# Patient Record
Sex: MALE | Race: OTHER | NOT HISPANIC OR LATINO | URBAN - METROPOLITAN AREA
[De-identification: names, ages, dates, MRNs, and addresses within clinical notes are randomized per-mention and may not be internally consistent; named-entity substitution may affect disease eponyms.]

---

## 2020-10-05 ENCOUNTER — INPATIENT (INPATIENT)
Facility: HOSPITAL | Age: 55
LOS: 2 days | Discharge: HOME | End: 2020-10-08
Attending: PSYCHIATRY & NEUROLOGY | Admitting: PSYCHIATRY & NEUROLOGY
Payer: MEDICARE

## 2020-10-05 VITALS
OXYGEN SATURATION: 100 % | SYSTOLIC BLOOD PRESSURE: 117 MMHG | RESPIRATION RATE: 18 BRPM | TEMPERATURE: 98 F | DIASTOLIC BLOOD PRESSURE: 88 MMHG | HEART RATE: 72 BPM

## 2020-10-05 DIAGNOSIS — F60.3 BORDERLINE PERSONALITY DISORDER: ICD-10-CM

## 2020-10-05 DIAGNOSIS — F33.1 MAJOR DEPRESSIVE DISORDER, RECURRENT, MODERATE: ICD-10-CM

## 2020-10-05 LAB
ALBUMIN SERPL ELPH-MCNC: 4.5 G/DL — SIGNIFICANT CHANGE UP (ref 3.3–5)
ALP SERPL-CCNC: 79 U/L — SIGNIFICANT CHANGE UP (ref 40–120)
ALT FLD-CCNC: 16 U/L — SIGNIFICANT CHANGE UP (ref 4–41)
AMPHET UR-MCNC: NEGATIVE — SIGNIFICANT CHANGE UP
ANION GAP SERPL CALC-SCNC: 11 MMO/L — SIGNIFICANT CHANGE UP (ref 7–14)
APAP SERPL-MCNC: < 15 UG/ML — LOW (ref 15–25)
APPEARANCE UR: CLEAR — SIGNIFICANT CHANGE UP
AST SERPL-CCNC: 12 U/L — SIGNIFICANT CHANGE UP (ref 4–40)
BACTERIA # UR AUTO: SIGNIFICANT CHANGE UP
BARBITURATES UR SCN-MCNC: NEGATIVE — SIGNIFICANT CHANGE UP
BASOPHILS # BLD AUTO: 0.02 K/UL — SIGNIFICANT CHANGE UP (ref 0–0.2)
BASOPHILS NFR BLD AUTO: 0.3 % — SIGNIFICANT CHANGE UP (ref 0–2)
BENZODIAZ UR-MCNC: NEGATIVE — SIGNIFICANT CHANGE UP
BILIRUB SERPL-MCNC: 0.9 MG/DL — SIGNIFICANT CHANGE UP (ref 0.2–1.2)
BILIRUB UR-MCNC: NEGATIVE — SIGNIFICANT CHANGE UP
BLOOD UR QL VISUAL: NEGATIVE — SIGNIFICANT CHANGE UP
BUN SERPL-MCNC: 17 MG/DL — SIGNIFICANT CHANGE UP (ref 7–23)
CALCIUM SERPL-MCNC: 9.9 MG/DL — SIGNIFICANT CHANGE UP (ref 8.4–10.5)
CANNABINOIDS UR-MCNC: NEGATIVE — SIGNIFICANT CHANGE UP
CHLORIDE SERPL-SCNC: 101 MMOL/L — SIGNIFICANT CHANGE UP (ref 98–107)
CO2 SERPL-SCNC: 27 MMOL/L — SIGNIFICANT CHANGE UP (ref 22–31)
COCAINE METAB.OTHER UR-MCNC: POSITIVE — SIGNIFICANT CHANGE UP
COD CRY URNS QL: SIGNIFICANT CHANGE UP (ref 0–0)
COLOR SPEC: YELLOW — SIGNIFICANT CHANGE UP
CREAT SERPL-MCNC: 0.89 MG/DL — SIGNIFICANT CHANGE UP (ref 0.5–1.3)
EOSINOPHIL # BLD AUTO: 0.24 K/UL — SIGNIFICANT CHANGE UP (ref 0–0.5)
EOSINOPHIL NFR BLD AUTO: 3.3 % — SIGNIFICANT CHANGE UP (ref 0–6)
ETHANOL BLD-MCNC: < 10 MG/DL — SIGNIFICANT CHANGE UP
GLUCOSE SERPL-MCNC: 80 MG/DL — SIGNIFICANT CHANGE UP (ref 70–99)
GLUCOSE UR-MCNC: NEGATIVE — SIGNIFICANT CHANGE UP
HCT VFR BLD CALC: 44 % — SIGNIFICANT CHANGE UP (ref 39–50)
HGB BLD-MCNC: 14.3 G/DL — SIGNIFICANT CHANGE UP (ref 13–17)
HYALINE CASTS # UR AUTO: NEGATIVE — SIGNIFICANT CHANGE UP
IMM GRANULOCYTES NFR BLD AUTO: 0.1 % — SIGNIFICANT CHANGE UP (ref 0–1.5)
KETONES UR-MCNC: SIGNIFICANT CHANGE UP
LEUKOCYTE ESTERASE UR-ACNC: NEGATIVE — SIGNIFICANT CHANGE UP
LYMPHOCYTES # BLD AUTO: 1.17 K/UL — SIGNIFICANT CHANGE UP (ref 1–3.3)
LYMPHOCYTES # BLD AUTO: 16 % — SIGNIFICANT CHANGE UP (ref 13–44)
MCHC RBC-ENTMCNC: 26.1 PG — LOW (ref 27–34)
MCHC RBC-ENTMCNC: 32.5 % — SIGNIFICANT CHANGE UP (ref 32–36)
MCV RBC AUTO: 80.3 FL — SIGNIFICANT CHANGE UP (ref 80–100)
METHADONE UR-MCNC: NEGATIVE — SIGNIFICANT CHANGE UP
MONOCYTES # BLD AUTO: 0.55 K/UL — SIGNIFICANT CHANGE UP (ref 0–0.9)
MONOCYTES NFR BLD AUTO: 7.5 % — SIGNIFICANT CHANGE UP (ref 2–14)
MUCOUS THREADS # UR AUTO: SIGNIFICANT CHANGE UP
NEUTROPHILS # BLD AUTO: 5.31 K/UL — SIGNIFICANT CHANGE UP (ref 1.8–7.4)
NEUTROPHILS NFR BLD AUTO: 72.8 % — SIGNIFICANT CHANGE UP (ref 43–77)
NITRITE UR-MCNC: NEGATIVE — SIGNIFICANT CHANGE UP
NRBC # FLD: 0 K/UL — SIGNIFICANT CHANGE UP (ref 0–0)
OPIATES UR-MCNC: NEGATIVE — SIGNIFICANT CHANGE UP
OXYCODONE UR-MCNC: NEGATIVE — SIGNIFICANT CHANGE UP
PCP UR-MCNC: NEGATIVE — SIGNIFICANT CHANGE UP
PH UR: 6 — SIGNIFICANT CHANGE UP (ref 5–8)
PLATELET # BLD AUTO: 317 K/UL — SIGNIFICANT CHANGE UP (ref 150–400)
PMV BLD: 10.8 FL — SIGNIFICANT CHANGE UP (ref 7–13)
POTASSIUM SERPL-MCNC: 4.5 MMOL/L — SIGNIFICANT CHANGE UP (ref 3.5–5.3)
POTASSIUM SERPL-SCNC: 4.5 MMOL/L — SIGNIFICANT CHANGE UP (ref 3.5–5.3)
PROT SERPL-MCNC: 7.1 G/DL — SIGNIFICANT CHANGE UP (ref 6–8.3)
PROT UR-MCNC: 30 — SIGNIFICANT CHANGE UP
RBC # BLD: 5.48 M/UL — SIGNIFICANT CHANGE UP (ref 4.2–5.8)
RBC # FLD: 13.9 % — SIGNIFICANT CHANGE UP (ref 10.3–14.5)
RBC CASTS # UR COMP ASSIST: SIGNIFICANT CHANGE UP (ref 0–?)
SALICYLATES SERPL-MCNC: < 5 MG/DL — LOW (ref 15–30)
SODIUM SERPL-SCNC: 139 MMOL/L — SIGNIFICANT CHANGE UP (ref 135–145)
SP GR SPEC: 1.03 — SIGNIFICANT CHANGE UP (ref 1–1.04)
SQUAMOUS # UR AUTO: SIGNIFICANT CHANGE UP
TSH SERPL-MCNC: 0.94 UIU/ML — SIGNIFICANT CHANGE UP (ref 0.27–4.2)
UROBILINOGEN FLD QL: HIGH
WBC # BLD: 7.3 K/UL — SIGNIFICANT CHANGE UP (ref 3.8–10.5)
WBC # FLD AUTO: 7.3 K/UL — SIGNIFICANT CHANGE UP (ref 3.8–10.5)
WBC UR QL: SIGNIFICANT CHANGE UP (ref 0–?)

## 2020-10-05 PROCEDURE — 99285 EMERGENCY DEPT VISIT HI MDM: CPT | Mod: GC

## 2020-10-05 RX ORDER — TRAZODONE HCL 50 MG
50 TABLET ORAL AT BEDTIME
Refills: 0 | Status: DISCONTINUED | OUTPATIENT
Start: 2020-10-06 | End: 2020-10-08

## 2020-10-05 RX ORDER — GABAPENTIN 400 MG/1
100 CAPSULE ORAL THREE TIMES A DAY
Refills: 0 | Status: DISCONTINUED | OUTPATIENT
Start: 2020-10-06 | End: 2020-10-08

## 2020-10-05 RX ORDER — SERTRALINE 25 MG/1
50 TABLET, FILM COATED ORAL DAILY
Refills: 0 | Status: DISCONTINUED | OUTPATIENT
Start: 2020-10-06 | End: 2020-10-08

## 2020-10-05 NOTE — ED BEHAVIORAL HEALTH NOTE - BEHAVIORAL HEALTH NOTE
Writer provided number for pt's daughter (13 yrs old) mother at [phone]. Writer called number 2x and received VM. Message left with a return call requested. Writer then called emergency contact listed as Shahrzad Duron at [phone]. Ms. Duron provided the following information:      Ms. Duron reportedly the godmother of pt's daughter. Ms. Duron has known pt since 1995. Pt reportedly living alone although recently gaining custody of 13 year old daughter, Haven Amador, in July 2020. Pt received house through [employer] with some type of subsidized rent reported. Ms. Duron states daughter although staying with mother who usually just has pt on weekends for last month. Pt reported to have a hx of substance use. Ms. Duron reports drug of choice to be crack. Pt was reported to be sober for some time and "thought he was able to manage it". Ms. Duron reported talking to pt briefly over last couple weeks by text. Pt apparently sent a text today saying goodbye. The text was followed by another text 45 minutes later saying "tell my family goodbye, I'm sorry to do this right now". Pt's sister reached out to Ms. Duron who reported that pt sent her a similar text and that they were trying to get a hold of pt. Ms. Duron reports similar situation when pt relapses he gets depressed. One past sucide attempts via "took pills" said to be in the early 90's. No hx of psychosis reported. No known concerns reported. Ms. Duron denies any concerns for safety of daughter. Ms. Amador reports that pt is very close with his daugther and daugther does not always get along with mother. Daughter apparently choose to live with father full time. Daughter reported to although be safe with both mother and father. Pt said to be good father. Pt recently changed job working nights. Daughter was staying with mother while pt was working. Ms. Duron reports that she personally spoke with pt's daughter today and that she was okay. Ms. Duron denies safety concerns for daughter. Ms. Duron expresses thought that pt got a large sum of money and used it on substances.

## 2020-10-05 NOTE — ED BEHAVIORAL HEALTH ASSESSMENT NOTE - CASE SUMMARY
54/M with hx (as per Psyckes) of MDD, PTSD, and borderline PD; has past in-Pt psych admissions, hx of SA (OD on pills, throwing self in front of incoming traffic) as well as self-injurious behavior (via cutting) and hx of cocaine/alcohol abuse.  Today, presented to the ED BIB EMS for SI.     At this time, the Pt endorses worsening symptoms of depression and anxiety precipitated and perpetuated by multiple psychosocial stressors.  The Pt is dysphoric; anxious about current life situation much so that he claimed to have relapse on cocaine.  Pt has engaged in illicit drug use as a means to alleviate symptoms of depression and anxiety.  Pt is endorsing passive SI but no intent or plans.  No active Suicidality or any homicidality expressed.  Currently, does not appear to be acutely manic or psychotic. Is not in withdrawal.  Pt is not delirious.  Pt's symptoms of depression and anxiety have caused functional impairment.  At this time, unable to partake towards safety planning enough to warrant for a safe discharge back to the community.  Is expressing ambivalence - "does not feel safe if discharged". Pt is help seeking and wants to pursue voluntary admission.    RECOMMENDATIONS:  1. RESTART with the following: Zoloft 50mg daily for control of his depression. Treatment team to titrate as deemed necessary.  RESTART Gabapentin 100mg TID for control of his anxiety.   2. PRN: Trazodone 50mg HS PRN as off label for sleep disturbance   3. PRN: ativan 2mg PO/IM q6Hrs for anxiety/agitation (PO)/ SEVERE AGITATION (IM)   4. once medically cleared, facilitate transfer to [facility] on 9.13 status

## 2020-10-05 NOTE — ED BEHAVIORAL HEALTH ASSESSMENT NOTE - DESCRIPTION (FIRST USE, LAST USE, QUANTITY, FREQUENCY, DURATION)
pt with hx of alcohol use disorder, in rehab in 2014, last drink was "sips of liquor" late Sunday night used THC but has not used in over 20 years pt with hx of cocaine use disorder, in rehab in 2014, used for the first time since then late Sunday night

## 2020-10-05 NOTE — ED BEHAVIORAL HEALTH ASSESSMENT NOTE - PSYCHIATRIC ISSUES AND PLAN (INCLUDE STANDING AND PRN MEDICATION)
restart Zoloft at 50 mg daily; start Gabapentin 100 mg TID; start Trazodone 50 mg qHS PRN for sleep; Ativan 2 mg PO/IM PRN q6H for agitation

## 2020-10-05 NOTE — ED ADULT NURSE NOTE - OBJECTIVE STATEMENT
Arrives from home states he went through a custody contreras with his ex-wife recently and has been having passive SI. Denies HI, reports cocaine use at 3am and one alcoholic drink at about 3pm. PMH depression, BPD, anxiety  Patient to be evaluated by psych MD. Seen by Medical and waiting for further orders and disposition.   JF Kapoor

## 2020-10-05 NOTE — ED ADULT TRIAGE NOTE - CHIEF COMPLAINT QUOTE
Arrives from [place] states he went through a custody contreras with his ex-wife recently and has been having passive SI. Denies HI, reports cocaine use at 3am and one alcoholic drink at about 3pm. PMH depression, BPD, anxiety

## 2020-10-05 NOTE — ED BEHAVIORAL HEALTH NOTE - BEHAVIORAL HEALTH NOTE
COVID Exposure Screen- Patient    1.	*In the past 14 days, have you been around anyone with a positive COVID-19 test?*   (  ) Yes   ( X ) No   (  ) Unknown- Reason (e.g. patient uncertain, sedated, refusing to answer, etc.):  ______  IF YES PROCEED TO QUESTION #2. IF NO or UNKNOWN, PLEASE SKIP TO QUESTION #7  2.	Were you within 6 feet of them for at least 15 minutes? (  ) Yes   (  ) No   (  ) Unknown- Reason: ______    3.	Have you provided care for them? (  ) Yes   (  ) No   (  ) Unknown- Reason: ______    4.	Have you had direct physical contact with them (touched, hugged, or kissed them)? (  ) Yes   (  ) No    (  ) Unknown- Reason: ______    5.	Have you shared eating or drinking utensils with them? (  ) Yes   (  ) No    (  ) Unknown- Reason: ______    6.	Have they sneezed, coughed, or somehow got respiratory droplets on you? (  ) Yes   (  ) No    (  ) Unknown- Reason: ______    7.	*Have you been out of [place] within the past 14 days?*  (  ) Yes   ( X ) No   (  ) Unknown- Reason (e.g. patient uncertain, sedated, refusing to answer, etc.): _______  IF YES PLEASE ANSWER THE FOLLOWING QUESTIONS:  8.	Which state/country have you been to? ______   9.	Were you there over 24 hours? (  ) Yes   (  ) No    (  ) Unknown- Reason: ______    10.	What date did you return to [facility]? ______

## 2020-10-05 NOTE — ED BEHAVIORAL HEALTH ASSESSMENT NOTE - ADDITIONAL DETAILS ALL
pt reports remote hx of SAs via throwing himself into traffic, cutting, and overdosing on pills from his mid20s to mid30s

## 2020-10-05 NOTE — ED BEHAVIORAL HEALTH ASSESSMENT NOTE - MODIFICATIONS
I have examined and evaluated the patient with Dr NATALIIA Mancilla and performed key elements of the History and Mental Status/Physical examination.  I agree with her assessment and recommendations.

## 2020-10-05 NOTE — ED BEHAVIORAL HEALTH ASSESSMENT NOTE - OTHER PAST PSYCHIATRIC HISTORY (INCLUDE DETAILS REGARDING ONSET, COURSE OF ILLNESS, INPATIENT/OUTPATIENT TREATMENT)
hx of borderline personality disorder, depression, anxiety, substance use disorder   multiple past inpatient hospitalizations (including [place], [place], [facility]) - most recent was [facility] in 2013 as per GENNY   currently in outpatient treatment in [facility], primarily with therapist Ryann who he talks to weekly   hx of suicide attempts/parasuicidal behavior during his 20s and 30s via overdose, jumping in front of car, cutting

## 2020-10-05 NOTE — ED BEHAVIORAL HEALTH ASSESSMENT NOTE - RISK ASSESSMENT
Risk factors:   Acute: current mood episode, interpersonal conflict, lack of medication consistency, recent SI with plan and preparatory acts, recent substance use   Chronic: gender, hx of past inpatient hospitalizations, hx of past suicide attempts, hx of substance use disorder, borderline personality disorder    Protective: domiciled, has daughter, no CAH, no access to firearms, employed, help-seeking     Veronica is a high acute risk of harm to self given his multiple acute and chronic risk factors. He requires inpatient hospitalization for safety, stabilization, and medication management. High Acute Suicide Risk

## 2020-10-05 NOTE — ED ADULT NURSE NOTE - CHIEF COMPLAINT QUOTE
Arrives from [place] states he went through a custody contreras with his ex-wife recently and has been having passive SI. Denies HI, reports cocaine use at 3am and one alcoholic drink at about 3pm. PMH depression, BPD, anxiety

## 2020-10-05 NOTE — ED PROVIDER NOTE - CLINICAL SUMMARY MEDICAL DECISION MAKING FREE TEXT BOX
This is a 54 yr old M, pmh depression, bipolar disorder with c/o depression and passive SI. Pt states having a hard time. Currently she has a custody over her daughter, who is visiting her mother and wants to stay with the mother. He agreed to it. But daughter's mother asking money from him, and they have argument about custody and financial issues since Saturday. PT endorses he wants to hurt himself. He reports previous si attempt, od on pills and last psych hospitalization in 2007.   Labs, psych consult- recommendation inpatient treatment.

## 2020-10-05 NOTE — ED BEHAVIORAL HEALTH ASSESSMENT NOTE - DETAILS
14 years old see HPI handoff provided to [facility] Dr. Kilpatrikc informed sister, Jonna [phone]

## 2020-10-05 NOTE — ED PROVIDER NOTE - OBJECTIVE STATEMENT
This is a 54 yr old M, pmh depression, bipolar disorder with c/o depression and passive SI. Pt states having a hard time. Currently she has a custody over her daughter, who is visiting her mother and wants to stay with the mother. He agreed to it. But daughter's mother asking money from him, and they have argument about custody and financial issues since Saturday. PT endorses he wants to hurt himself. He reports previous si attempt, od on pills and last psych hospitalization in 2007.

## 2020-10-05 NOTE — ED BEHAVIORAL HEALTH NOTE - BEHAVIORAL HEALTH NOTE
Writer called back Ms. Duron who provided the following additional contact information.     Wandy [phone]  Jonna (sister) #[phone]    Writer contacted pt's sister at [phone]. Sister activated 911 due to pt sending a text to her that did not sound okay. Sister reports pt then not answering and that pt was not answering. Sister trying to reach him for around 2 weeks. Sister in contact with daughter, her mother, and pt's friend. No concerns reported for safety of daughter. Daughter reported to be safe with both parents. Sister reports pt stressed since COVID, trying to collect employment, and so on. 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 No other concerns reported. Sister just wants pt to reach out and to make sure that pt is okay. Writer called back Ms. Duron who provided the following additional contact information.     Wandy [phone]  Jonna (sister) #[phone]    Writer contacted pt's sister at [phone]. Sister activated 911 due to pt sending a text to her that did not sound okay. Sister reports pt then not answering and that pt was not answering. Sister trying to reach him for around 2 weeks. Sister in contact with daughter, her mother, and pt's friend. No concerns reported for safety of daughter. Daughter reported to be safe with both parents. Sister reports pt stressed since COVID, trying to collect employment, and so on. 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 No other concerns reported. Sister just wants pt to reach out and to make sure that pt is okay. Case discussed with team. No child safety concerns found. Writer called back Ms. Duron who provided the following additional contact information.     Wandy [phone]  Jonna (sister) #[phone]    Writer contacted pt's sister at [phone]. Sister activated 911 due to pt sending a text to her that did not sound okay. Sister reports pt then not answering. Sister trying to reach him for around 2 weeks. Sister in contact with daughter, her mother, and pt's friend. No concerns reported for safety of daughter. Daughter reported to be safe with both parents. Sister reports pt stressed since COVID, trying to collect employment, and so on. 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 No other concerns reported. Sister just wants pt to reach out and to make sure that pt is okay. Case discussed with team. No child safety concerns found.

## 2020-10-05 NOTE — ED BEHAVIORAL HEALTH ASSESSMENT NOTE - DESCRIPTION
Vital Signs (24 Hrs):  T(C): 36.8 (10-05-20 @ 19:34), Max: 36.8 (10-05-20 @ 19:34)  HR: 72 (10-05-20 @ 19:34) (72 - 72)  BP: 117/88 (10-05-20 @ 19:34) (117/88 - 117/88)  RR: 18 (10-05-20 @ 19:34) (18 - 18)  SpO2: 100% (10-05-20 @ 19:34) (100% - 100%)  Wt(kg): --  Daily     Daily     I&O's Summary asthma grew up in [place], has lived in [place] for past few years, works as  at sports bar/restaurant in [place], Pt is calm and cooperative.     Vital Signs (24 Hrs):  T(C): 36.8 (10-05-20 @ 19:34), Max: 36.8 (10-05-20 @ 19:34)  HR: 72 (10-05-20 @ 19:34) (72 - 72)  BP: 117/88 (10-05-20 @ 19:34) (117/88 - 117/88)  RR: 18 (10-05-20 @ 19:34) (18 - 18)  SpO2: 100% (10-05-20 @ 19:34) (100% - 100%)  Wt(kg): --  Daily     Daily     I&O's Summary

## 2020-10-05 NOTE — ED BEHAVIORAL HEALTH ASSESSMENT NOTE - CURRENT MEDICATION
as per GENNY pt last picked up the following meds in 2016, he states he still takes them sporadically:   Depakote 750 mg daily   gabapentin 900 mg daily   Zoloft 100 mg daily   Trazodone 150 mg qHS

## 2020-10-05 NOTE — ED BEHAVIORAL HEALTH ASSESSMENT NOTE - SUICIDE RISK FACTORS
Mood Disorder current/past/Cluster B Personality disorders or traits current/past/Hopelessness or despair/Unable to engage in safety planning/Access to lethal methods (pills, firearm, etc.: Ask specifically about presence or absence of a firearm in the home or ease of accessing

## 2020-10-05 NOTE — ED BEHAVIORAL HEALTH ASSESSMENT NOTE - HPI (INCLUDE ILLNESS QUALITY, SEVERITY, DURATION, TIMING, CONTEXT, MODIFYING FACTORS, ASSOCIATED SIGNS AND SYMPTOMS)
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[place], [place], with a PPHx of depression, borderline personality disorder, currently in outpatient treatment at [facility] with therapist and psychiatrist, with inconsistent adherence to medication regimen, multiple past inpatient hospitalizations across [facility], most recently at [facility] in 2013, multiple past suicide attempts/parasuicidal behavior, however none in the past 5 years, substance use hx of alcohol use disorder and cocaine use disorder, in inpatient rehab at [facility] in 2014, legal hx of restraining order against daughter's wife, no hx of aggression/violence BIBEMS activated by sister in the context of SI.     On evaluation, the patient describes how on Saturday he got into an argument with the mother of his daughter regarding custody of his daughter. Patient was very upset and stressed by this argument. He went to work on Saturday. On Sunday Adin continued to feel distressed. He did not go into work and was "wandering on the streets." He ended up using cocaine and drinking a "few sips" of liquor - states this was his first time using substances in 6 years. Today the patient was returning to his home after being out and while on the train started contemplating suicide. States that he texted his sister and his daughter's godmother goodbye messages, asking them to forgive if he did anything to hurt himself. The patient states that he was considering going to the [email or web address] and jump off of it. He called his daughter - he did not share with her his plan, but just spoke to her. Patient states that speaking to his daughter made him realize he should not do anything for her sake. Once he arrived home, the police were at his home because his sister called 911.     Prior to the altercation with his daughter's mother, the patient states he had not had suicidal thoughts in 6 years. The thought to jump in front of the  [employer] only formed today and beyond the text messages he sent, there were no other preparatory acts. He says that prior to this, over the past month, things have been stable for him. He feels content with his work. He states his sleep is erratic depending on his shifts. The patient has a therapist, Ryann, at [facility] that he speaks to once/week. He also has a psychiatrist on maternity leave but he has not connected with the covering psychiatrist. Adin states he takes trazodone, Zoloft, gabapentin, and depakote. As per PSYCKES, he has not picked any of these medications up since 2016. When confronted about this, the patient states that he does not  his medications as prescribed and takes leftover pills from 2016 inconsistently.     He denies SI currently, but states that he is afraid if he goes home, his thoughts will "go all over the place and I don't know what I will do. I can't be alone." He denies HI. Denies AH/VH. Denies manic and psychotic sxs. 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 with therapist and psychiatrist, with inconsistent adherence to medication regimen, multiple past inpatient hospitalizations across [facility], most recently at [facility] in 2013, multiple past suicide attempts/parasuicidal behavior, however none in the past 5 years, substance use hx of alcohol use disorder and cocaine use disorder, in inpatient rehab at [facility] in 2014, legal hx of restraining order against daughter's wife, no hx of aggression/violence BIBEMS activated by sister in the context of SI.     On evaluation, the patient describes how on Saturday he got into an argument with the mother of his daughter regarding custody of his daughter. Patient was very upset and stressed by this argument. He went to work on Saturday. On Sunday Adin continued to feel distressed. He did not go into work and was "wandering on the streets." He ended up using cocaine and drinking a "few sips" of liquor - states this was his first time using substances in 6 years. Today the patient was returning to his home after being out and while on the train started contemplating suicide. States that he texted his sister and his daughter's godmother goodbye messages, asking them to forgive if he did anything to hurt himself. The patient states that he was considering going to the [employer] and jump off of it. He called his daughter - he did not share with her his plan, but just spoke to her. Patient states that speaking to his daughter made him realize he should not do anything for her sake. Once he arrived home, the police were at his home because his sister called 911.     Prior to the altercation with his daughter's mother, the patient states he had not had suicidal thoughts in 6 years. The thought to jump in front of the  [employer] only formed today and beyond the text messages he sent, there were no other preparatory acts. He says that prior to this, over the past month, things have been stable for him. He feels content with his work. He states his sleep is erratic depending on his shifts. The patient has a therapist, Ryann ([phone]), at [facility] that he speaks to once/week. He also has a psychiatrist on maternity leave but he has not connected with the covering psychiatrist. Adin states he takes trazodone, Zoloft, gabapentin, and depakote. As per PSYCKES, he has not picked any of these medications up since 2016. When confronted about this, the patient states that he does not  his medications as prescribed and takes leftover pills from 2016 inconsistently.     He denies SI currently, but states that he is afraid if he goes home, his thoughts will "go all over the place and I don't know what I will do. I can't be alone." He denies HI. Denies AH/VH. Denies manic and psychotic sxs.

## 2020-10-05 NOTE — ED BEHAVIORAL HEALTH ASSESSMENT NOTE - SUMMARY
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[place], [place], with a PPHx of depression, borderline personality disorder, currently in outpatient treatment at [facility] with therapist and psychiatrist, with inconsistent adherence to medication regimen, multiple past inpatient hospitalizations across [facility], most recently at [facility] in 2013, multiple past suicide attempts/parasuicidal behavior, however none in the past 5 years, substance use hx of alcohol use disorder and cocaine use disorder, in inpatient rehab at [facility] in 2014, legal hx of restraining order against daughter's wife, no hx of aggression/violence BIBEMS activated by sister in the context of SI.     On evaluation, the patient describes his SI with plan of jumping off the [employer] [employer] and preparatory act of sending goodbye texts to family/friends. This is in the context of recent conflict with his daughter's mother and use of substances after 6 years of sobriety. Although the patient denies suicidal thoughts currently, he is unable to engage in safety planning and is concerned his thoughts may arise if he is alone and he may act on them. Also, although Adin is in outpatient treatment, he is nonadherent with medication regimen and will benefit from medication optimization. Given his high safety risk, patient meets criteria for inpatient psychiatric hospitalization and will be admitted on a voluntary basis.

## 2020-10-05 NOTE — ED BEHAVIORAL HEALTH ASSESSMENT NOTE - ACTIVATING EVENTS/STRESSORS
Current or pending social isolation/Triggering events leading to humiliation, shame, and/or despair (e.g. Loss of relationship, financial or health status) (real or anticipated)/Non-compliant or not receiving treatment/Inadequate social supports

## 2020-10-06 DIAGNOSIS — F33.9 MAJOR DEPRESSIVE DISORDER, RECURRENT, UNSPECIFIED: ICD-10-CM

## 2020-10-06 LAB
SARS-COV-2 IGG SERPL QL IA: POSITIVE
SARS-COV-2 IGM SERPL IA-ACNC: 83.4 AU/ML — HIGH
SARS-COV-2 RNA SPEC QL NAA+PROBE: SIGNIFICANT CHANGE UP

## 2020-10-06 PROCEDURE — 99222 1ST HOSP IP/OBS MODERATE 55: CPT

## 2020-10-06 RX ADMIN — Medication 50 MILLIGRAM(S): at 22:21

## 2020-10-06 RX ADMIN — SERTRALINE 50 MILLIGRAM(S): 25 TABLET, FILM COATED ORAL at 08:09

## 2020-10-06 RX ADMIN — GABAPENTIN 100 MILLIGRAM(S): 400 CAPSULE ORAL at 21:16

## 2020-10-06 RX ADMIN — GABAPENTIN 100 MILLIGRAM(S): 400 CAPSULE ORAL at 08:09

## 2020-10-06 NOTE — ED ADULT NURSE REASSESSMENT NOTE - NS ED NURSE REASSESS COMMENT FT1
Pt received post break. Pt sleeping, respirations even and non labored. Pt awaiting medical clearance before xfer to inpatient psychiatric unit.

## 2020-10-07 PROCEDURE — 99232 SBSQ HOSP IP/OBS MODERATE 35: CPT

## 2020-10-07 RX ORDER — TRAZODONE HCL 50 MG
1 TABLET ORAL
Qty: 15 | Refills: 0
Start: 2020-10-07

## 2020-10-07 RX ORDER — SERTRALINE 25 MG/1
1 TABLET, FILM COATED ORAL
Qty: 15 | Refills: 0
Start: 2020-10-07

## 2020-10-07 RX ORDER — GABAPENTIN 400 MG/1
1 CAPSULE ORAL
Qty: 45 | Refills: 0
Start: 2020-10-07

## 2020-10-07 RX ADMIN — GABAPENTIN 100 MILLIGRAM(S): 400 CAPSULE ORAL at 13:49

## 2020-10-07 RX ADMIN — GABAPENTIN 100 MILLIGRAM(S): 400 CAPSULE ORAL at 20:48

## 2020-10-07 RX ADMIN — Medication 50 MILLIGRAM(S): at 20:54

## 2020-10-07 RX ADMIN — SERTRALINE 50 MILLIGRAM(S): 25 TABLET, FILM COATED ORAL at 08:57

## 2020-10-07 RX ADMIN — GABAPENTIN 100 MILLIGRAM(S): 400 CAPSULE ORAL at 08:57

## 2020-10-08 VITALS — SYSTOLIC BLOOD PRESSURE: 108 MMHG | TEMPERATURE: 98 F | DIASTOLIC BLOOD PRESSURE: 68 MMHG | HEART RATE: 61 BPM

## 2020-10-08 PROCEDURE — 99238 HOSP IP/OBS DSCHRG MGMT 30/<: CPT

## 2020-10-08 RX ADMIN — GABAPENTIN 100 MILLIGRAM(S): 400 CAPSULE ORAL at 09:06

## 2020-10-08 RX ADMIN — SERTRALINE 50 MILLIGRAM(S): 25 TABLET, FILM COATED ORAL at 09:06
